# Patient Record
Sex: FEMALE | Race: WHITE | NOT HISPANIC OR LATINO
[De-identification: names, ages, dates, MRNs, and addresses within clinical notes are randomized per-mention and may not be internally consistent; named-entity substitution may affect disease eponyms.]

---

## 2017-03-20 ENCOUNTER — APPOINTMENT (OUTPATIENT)
Age: 37
End: 2017-03-20

## 2017-03-20 VITALS
WEIGHT: 108 LBS | HEIGHT: 63 IN | HEART RATE: 97 BPM | TEMPERATURE: 98.2 F | SYSTOLIC BLOOD PRESSURE: 117 MMHG | DIASTOLIC BLOOD PRESSURE: 83 MMHG | RESPIRATION RATE: 14 BRPM | BODY MASS INDEX: 19.14 KG/M2

## 2017-07-18 LAB
ALBUMIN SERPL ELPH-MCNC: 4 G/DL
ALP BLD-CCNC: 48 U/L
ALT SERPL-CCNC: 23 U/L
ANION GAP SERPL CALC-SCNC: 16 MMOL/L
AST SERPL-CCNC: 27 U/L
BILIRUB SERPL-MCNC: 0.4 MG/DL
BUN SERPL-MCNC: 8 MG/DL
CALCIUM SERPL-MCNC: 9.3 MG/DL
CHLORIDE SERPL-SCNC: 99 MMOL/L
CO2 SERPL-SCNC: 22 MMOL/L
CREAT SERPL-MCNC: 0.8 MG/DL
POTASSIUM SERPL-SCNC: 3.9 MMOL/L
PROT SERPL-MCNC: 6.7 G/DL
SODIUM SERPL-SCNC: 137 MMOL/L

## 2017-07-19 ENCOUNTER — APPOINTMENT (OUTPATIENT)
Age: 37
End: 2017-07-19

## 2017-07-19 VITALS
TEMPERATURE: 98.4 F | HEIGHT: 63 IN | SYSTOLIC BLOOD PRESSURE: 127 MMHG | BODY MASS INDEX: 19.49 KG/M2 | HEART RATE: 103 BPM | WEIGHT: 110 LBS | RESPIRATION RATE: 14 BRPM | DIASTOLIC BLOOD PRESSURE: 87 MMHG

## 2017-07-24 LAB
HBV DNA # SERPL NAA+PROBE: 170 IU/ML
HEPB DNA PCR LOG: 2.23 LOGIU/ML

## 2018-02-21 LAB
AFP-TM SERPL-MCNC: 2.4 NG/ML
ALBUMIN SERPL ELPH-MCNC: 4.1 G/DL
ALP BLD-CCNC: 54 U/L
ALT SERPL-CCNC: 23 U/L
ANION GAP SERPL CALC-SCNC: 13 MMOL/L
AST SERPL-CCNC: 21 U/L
BASOPHILS # BLD AUTO: 0.04 K/UL
BASOPHILS NFR BLD AUTO: 0.7 %
BILIRUB SERPL-MCNC: <0.2 MG/DL
BUN SERPL-MCNC: 10 MG/DL
CALCIUM SERPL-MCNC: 9.1 MG/DL
CHLORIDE SERPL-SCNC: 105 MMOL/L
CO2 SERPL-SCNC: 24 MMOL/L
CREAT SERPL-MCNC: 0.73 MG/DL
EOSINOPHIL # BLD AUTO: 0.14 K/UL
EOSINOPHIL NFR BLD AUTO: 2.3 %
HBV SURFACE AB SER QL: NONREACTIVE
HBV SURFACE AG SER QL: REACTIVE
HCT VFR BLD CALC: 38.4 %
HGB BLD-MCNC: 12.7 G/DL
IMM GRANULOCYTES NFR BLD AUTO: 0.2 %
LYMPHOCYTES # BLD AUTO: 2.6 K/UL
LYMPHOCYTES NFR BLD AUTO: 43.3 %
MAN DIFF?: NORMAL
MCHC RBC-ENTMCNC: 32.2 PG
MCHC RBC-ENTMCNC: 33.1 GM/DL
MCV RBC AUTO: 97.2 FL
MONOCYTES # BLD AUTO: 0.44 K/UL
MONOCYTES NFR BLD AUTO: 7.3 %
NEUTROPHILS # BLD AUTO: 2.77 K/UL
NEUTROPHILS NFR BLD AUTO: 46.2 %
PLATELET # BLD AUTO: 237 K/UL
POTASSIUM SERPL-SCNC: 3.8 MMOL/L
PROT SERPL-MCNC: 6.7 G/DL
RBC # BLD: 3.95 M/UL
RBC # FLD: 12.8 %
SODIUM SERPL-SCNC: 142 MMOL/L
WBC # FLD AUTO: 6 K/UL

## 2018-02-22 LAB
HBV DNA # SERPL NAA+PROBE: 132 IU/ML
HEPB DNA PCR LOG: 2.12 LOGIU/ML

## 2018-02-26 ENCOUNTER — APPOINTMENT (OUTPATIENT)
Age: 38
End: 2018-02-26
Payer: COMMERCIAL

## 2018-02-26 VITALS
TEMPERATURE: 98.1 F | HEIGHT: 63 IN | WEIGHT: 113 LBS | RESPIRATION RATE: 17 BRPM | HEART RATE: 103 BPM | DIASTOLIC BLOOD PRESSURE: 88 MMHG | BODY MASS INDEX: 20.02 KG/M2 | SYSTOLIC BLOOD PRESSURE: 131 MMHG

## 2018-02-26 PROCEDURE — 99214 OFFICE O/P EST MOD 30 MIN: CPT

## 2018-03-26 ENCOUNTER — FORM ENCOUNTER (OUTPATIENT)
Age: 38
End: 2018-03-26

## 2018-03-27 ENCOUNTER — OUTPATIENT (OUTPATIENT)
Dept: OUTPATIENT SERVICES | Facility: HOSPITAL | Age: 38
LOS: 1 days | End: 2018-03-27
Payer: COMMERCIAL

## 2018-03-27 ENCOUNTER — APPOINTMENT (OUTPATIENT)
Dept: ULTRASOUND IMAGING | Facility: CLINIC | Age: 38
End: 2018-03-27
Payer: COMMERCIAL

## 2018-03-27 DIAGNOSIS — B19.10 UNSPECIFIED VIRAL HEPATITIS B WITHOUT HEPATIC COMA: ICD-10-CM

## 2018-03-27 DIAGNOSIS — Z00.8 ENCOUNTER FOR OTHER GENERAL EXAMINATION: ICD-10-CM

## 2018-03-27 PROCEDURE — 76700 US EXAM ABDOM COMPLETE: CPT

## 2018-03-27 PROCEDURE — 76700 US EXAM ABDOM COMPLETE: CPT | Mod: 26

## 2018-04-16 ENCOUNTER — APPOINTMENT (OUTPATIENT)
Age: 38
End: 2018-04-16
Payer: COMMERCIAL

## 2018-04-16 PROCEDURE — 91200 LIVER ELASTOGRAPHY: CPT

## 2018-08-27 ENCOUNTER — APPOINTMENT (OUTPATIENT)
Dept: HEPATOLOGY | Facility: CLINIC | Age: 38
End: 2018-08-27

## 2018-08-27 ENCOUNTER — APPOINTMENT (OUTPATIENT)
Age: 38
End: 2018-08-27

## 2018-12-03 ENCOUNTER — APPOINTMENT (OUTPATIENT)
Dept: HEPATOLOGY | Facility: CLINIC | Age: 38
End: 2018-12-03
Payer: COMMERCIAL

## 2018-12-03 VITALS
WEIGHT: 115 LBS | HEART RATE: 114 BPM | SYSTOLIC BLOOD PRESSURE: 125 MMHG | HEIGHT: 63 IN | RESPIRATION RATE: 17 BRPM | DIASTOLIC BLOOD PRESSURE: 76 MMHG | TEMPERATURE: 99 F | BODY MASS INDEX: 20.38 KG/M2

## 2018-12-03 PROCEDURE — 99213 OFFICE O/P EST LOW 20 MIN: CPT

## 2018-12-04 LAB
HEPATITIS A IGG ANTIBODY: NONREACTIVE
IGA SER QL IEP: 149 MG/DL
IGG SER QL IEP: 1012 MG/DL
IGM SER QL IEP: 45 MG/DL

## 2018-12-06 LAB
ANA SER IF-ACNC: NEGATIVE
BILE AC SER-MCNC: 10.8 UMOL/L
MITOCHONDRIA AB SER IF-ACNC: NORMAL
SMOOTH MUSCLE AB SER QL IF: NORMAL

## 2019-02-07 ENCOUNTER — APPOINTMENT (OUTPATIENT)
Dept: RHEUMATOLOGY | Facility: CLINIC | Age: 39
End: 2019-02-07
Payer: COMMERCIAL

## 2019-02-07 VITALS
OXYGEN SATURATION: 98 % | BODY MASS INDEX: 20.55 KG/M2 | HEART RATE: 111 BPM | HEIGHT: 63 IN | DIASTOLIC BLOOD PRESSURE: 84 MMHG | WEIGHT: 116 LBS | SYSTOLIC BLOOD PRESSURE: 134 MMHG | TEMPERATURE: 98.7 F | RESPIRATION RATE: 16 BRPM

## 2019-02-07 DIAGNOSIS — Z00.00 ENCOUNTER FOR GENERAL ADULT MEDICAL EXAMINATION W/OUT ABNORMAL FINDINGS: ICD-10-CM

## 2019-02-07 DIAGNOSIS — Z71.6 TOBACCO ABUSE COUNSELING: ICD-10-CM

## 2019-02-07 DIAGNOSIS — L29.9 PRURITUS, UNSPECIFIED: ICD-10-CM

## 2019-02-07 PROCEDURE — 99245 OFF/OP CONSLTJ NEW/EST HI 55: CPT

## 2019-02-08 LAB
ANA SER IF-ACNC: NEGATIVE
C3 SERPL-MCNC: 131 MG/DL
C4 SERPL-MCNC: 35 MG/DL
CENTROMERE IGG SER-ACNC: <0.2 AL
CONFIRM: 25.7 SEC
DRVVT IMM 1:2 NP PPP: NORMAL
DRVVT SCREEN TO CONFIRM RATIO: 0.88 RATIO
DSDNA AB SER-ACNC: <12 IU/ML
ENA RNP AB SER IA-ACNC: <0.2 AL
ENA SCL70 IGG SER IA-ACNC: <0.2 AL
ENA SM AB SER IA-ACNC: <0.2 AL
ENA SS-A AB SER IA-ACNC: <0.2 AL
ENA SS-B AB SER IA-ACNC: <0.2 AL
SCREEN DRVVT: 27.9 SEC
SILICA CLOTTING TIME INTERPRETATION: NORMAL
SILICA CLOTTING TIME S/C: 0.9 RATIO
THYROGLOB AB SERPL-ACNC: <20 IU/ML
THYROPEROXIDASE AB SERPL IA-ACNC: <10 IU/ML
TSH SERPL-ACNC: 2.02 UIU/ML

## 2019-02-09 LAB
B2 GLYCOPROT1 IGA SERPL IA-ACNC: <5 SAU
B2 GLYCOPROT1 IGG SER-ACNC: <5 SGU
B2 GLYCOPROT1 IGM SER-ACNC: <5 SMU
CARDIOLIPIN IGM SER-MCNC: <5 GPL
CARDIOLIPIN IGM SER-MCNC: <5 MPL
DEPRECATED CARDIOLIPIN IGA SER: <5 APL
G6PD SER-CCNC: 15.5 U/G HGB

## 2019-02-10 LAB
PS IGA SER QL: <20 U/ML
PS IGG SER QL: 10 U/ML
PS IGM SER QL: <25 U/ML

## 2019-02-11 PROBLEM — Z71.6 ENCOUNTER FOR SMOKING CESSATION COUNSELING: Status: ACTIVE | Noted: 2019-02-11

## 2019-02-15 LAB — RNA POLYMERASE III IGG: <10 U

## 2019-02-26 ENCOUNTER — OTHER (OUTPATIENT)
Age: 39
End: 2019-02-26

## 2019-02-28 ENCOUNTER — APPOINTMENT (OUTPATIENT)
Dept: RHEUMATOLOGY | Facility: CLINIC | Age: 39
End: 2019-02-28
Payer: COMMERCIAL

## 2019-02-28 VITALS
DIASTOLIC BLOOD PRESSURE: 89 MMHG | HEART RATE: 109 BPM | HEIGHT: 63 IN | BODY MASS INDEX: 20.91 KG/M2 | WEIGHT: 118 LBS | SYSTOLIC BLOOD PRESSURE: 129 MMHG | TEMPERATURE: 99.1 F | OXYGEN SATURATION: 98 %

## 2019-02-28 DIAGNOSIS — I73.00 RAYNAUD'S SYNDROME W/OUT GANGRENE: ICD-10-CM

## 2019-02-28 PROCEDURE — 99214 OFFICE O/P EST MOD 30 MIN: CPT

## 2019-05-03 ENCOUNTER — APPOINTMENT (OUTPATIENT)
Dept: CARDIOLOGY | Facility: CLINIC | Age: 39
End: 2019-05-03
Payer: COMMERCIAL

## 2019-05-03 VITALS
SYSTOLIC BLOOD PRESSURE: 135 MMHG | HEIGHT: 63 IN | WEIGHT: 120 LBS | BODY MASS INDEX: 21.26 KG/M2 | DIASTOLIC BLOOD PRESSURE: 95 MMHG | HEART RATE: 88 BPM | OXYGEN SATURATION: 100 %

## 2019-05-03 PROCEDURE — 99204 OFFICE O/P NEW MOD 45 MIN: CPT

## 2019-05-03 RX ORDER — MULTIVITAMIN
TABLET ORAL
Refills: 0 | Status: ACTIVE | COMMUNITY

## 2019-05-05 NOTE — ASSESSMENT
[FreeTextEntry1] : Primary Raynaud's \par Livedo \par Active smoking \par No clear symptoms of claudication \par ADHD with active stimulant use\par \par Plan \par Check ABIS PVRS. \par Check Arterial duplex. \par Decrease Adderall if possible \par Must quick smoking. \par Agree with nifedipine\par immediately notify if signs of ulceration. \par

## 2019-05-05 NOTE — REASON FOR VISIT
[Initial Evaluation] : an initial evaluation of [FreeTextEntry2] : wenceslao  [FreeTextEntry1] : 39 y/o current smoker, on Adderall, hepatitis B in 2004. h/o Raynaud's presents for initial evaluation. \par Raynaud's longstanding, affects all extremities, responds to rewarming. Clearly worse in winter. No history of ulceration in any of her digits. 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 This is also longstanding. Seen by rheumatology who performed serologies that are negative. \par \par Denies sytemic symptoms, weight loss, other arthralgias, hematuria, signs or symptoms of vasculitis, wounds/ulcers, etc. \par \par Was prescribed nifedipine which she has not yet started.

## 2019-05-05 NOTE — PHYSICAL EXAM
[General Appearance - Well Developed] : well developed [Normal Conjunctiva] : the conjunctiva exhibited no abnormalities [Normal Oral Mucosa] : normal oral mucosa [Heart Sounds] : normal S1 and S2 [Bowel Sounds] : normal bowel sounds [Abnormal Walk] : normal gait [Nail Clubbing] : no clubbing of the fingernails [Oriented To Time, Place, And Person] : oriented to person, place, and time [2+] : left 2+ [1+] : left 1+ [Nail Splinter Hemorrhages] : no splinter hemorrhages of the nails [Petechial Hemorrhages (___cm)] : no petechial hemorrhages [] : no ischemic changes [Skin Turgor] : normal skin turgor [Skin Lesions] : no skin lesions [FreeTextEntry1] : lidedo diffusely over extremities and torso.

## 2019-06-26 ENCOUNTER — FORM ENCOUNTER (OUTPATIENT)
Age: 39
End: 2019-06-26

## 2019-06-27 ENCOUNTER — APPOINTMENT (OUTPATIENT)
Dept: ULTRASOUND IMAGING | Facility: HOSPITAL | Age: 39
End: 2019-06-27
Payer: COMMERCIAL

## 2019-06-27 ENCOUNTER — OUTPATIENT (OUTPATIENT)
Dept: OUTPATIENT SERVICES | Facility: HOSPITAL | Age: 39
LOS: 1 days | End: 2019-06-27
Payer: COMMERCIAL

## 2019-06-27 DIAGNOSIS — I73.00 RAYNAUD'S SYNDROME WITHOUT GANGRENE: ICD-10-CM

## 2019-06-27 PROCEDURE — 93925 LOWER EXTREMITY STUDY: CPT | Mod: 26

## 2019-06-27 PROCEDURE — 93923 UPR/LXTR ART STDY 3+ LVLS: CPT | Mod: 26

## 2019-06-27 PROCEDURE — 93925 LOWER EXTREMITY STUDY: CPT

## 2019-06-27 PROCEDURE — 93923 UPR/LXTR ART STDY 3+ LVLS: CPT

## 2019-08-15 ENCOUNTER — APPOINTMENT (OUTPATIENT)
Dept: HEPATOLOGY | Facility: CLINIC | Age: 39
End: 2019-08-15

## 2019-09-27 ENCOUNTER — APPOINTMENT (OUTPATIENT)
Dept: HEPATOLOGY | Facility: CLINIC | Age: 39
End: 2019-09-27
Payer: COMMERCIAL

## 2019-09-27 VITALS
HEART RATE: 88 BPM | HEIGHT: 63 IN | TEMPERATURE: 98.2 F | RESPIRATION RATE: 16 BRPM | WEIGHT: 128 LBS | SYSTOLIC BLOOD PRESSURE: 119 MMHG | BODY MASS INDEX: 22.68 KG/M2 | DIASTOLIC BLOOD PRESSURE: 81 MMHG

## 2019-09-27 PROCEDURE — 99214 OFFICE O/P EST MOD 30 MIN: CPT

## 2019-09-30 LAB
AFP-TM SERPL-MCNC: 68.3 NG/ML
ALBUMIN SERPL ELPH-MCNC: 3.9 G/DL
ALP BLD-CCNC: 48 U/L
ALT SERPL-CCNC: 12 U/L
ANION GAP SERPL CALC-SCNC: 15 MMOL/L
AST SERPL-CCNC: 15 U/L
BASOPHILS # BLD AUTO: 0.06 K/UL
BASOPHILS NFR BLD AUTO: 0.9 %
BILIRUB SERPL-MCNC: 0.2 MG/DL
BUN SERPL-MCNC: 9 MG/DL
CALCIUM SERPL-MCNC: 9.6 MG/DL
CHLORIDE SERPL-SCNC: 101 MMOL/L
CO2 SERPL-SCNC: 20 MMOL/L
CREAT SERPL-MCNC: 0.73 MG/DL
EOSINOPHIL # BLD AUTO: 0.1 K/UL
EOSINOPHIL NFR BLD AUTO: 1.4 %
HBV CORE IGG+IGM SER QL: REACTIVE
HBV DNA # SERPL NAA+PROBE: 4385 IU/ML
HBV SURFACE AB SER QL: NONREACTIVE
HBV SURFACE AG SER QL: REACTIVE
HCT VFR BLD CALC: 40.6 %
HEPB DNA PCR LOG: 3.64
HGB BLD-MCNC: 13.1 G/DL
IMM GRANULOCYTES NFR BLD AUTO: 0.1 %
LYMPHOCYTES # BLD AUTO: 1.92 K/UL
LYMPHOCYTES NFR BLD AUTO: 27.7 %
MAN DIFF?: NORMAL
MCHC RBC-ENTMCNC: 30.5 PG
MCHC RBC-ENTMCNC: 32.3 GM/DL
MCV RBC AUTO: 94.4 FL
MONOCYTES # BLD AUTO: 0.27 K/UL
MONOCYTES NFR BLD AUTO: 3.9 %
NEUTROPHILS # BLD AUTO: 4.56 K/UL
NEUTROPHILS NFR BLD AUTO: 66 %
PLATELET # BLD AUTO: 270 K/UL
POTASSIUM SERPL-SCNC: 4.2 MMOL/L
PROT SERPL-MCNC: 6.5 G/DL
RBC # BLD: 4.3 M/UL
RBC # FLD: 12 %
SODIUM SERPL-SCNC: 136 MMOL/L
WBC # FLD AUTO: 6.92 K/UL

## 2019-10-01 LAB
HBV E AB SER QL: POSITIVE
HBV E AG SER QL: NEGATIVE

## 2019-10-07 ENCOUNTER — FORM ENCOUNTER (OUTPATIENT)
Age: 39
End: 2019-10-07

## 2019-10-08 ENCOUNTER — APPOINTMENT (OUTPATIENT)
Dept: ULTRASOUND IMAGING | Facility: CLINIC | Age: 39
End: 2019-10-08
Payer: MEDICAID

## 2019-10-08 ENCOUNTER — OUTPATIENT (OUTPATIENT)
Dept: OUTPATIENT SERVICES | Facility: HOSPITAL | Age: 39
LOS: 1 days | End: 2019-10-08
Payer: MEDICAID

## 2019-10-08 DIAGNOSIS — Z00.8 ENCOUNTER FOR OTHER GENERAL EXAMINATION: ICD-10-CM

## 2019-10-08 PROCEDURE — 76700 US EXAM ABDOM COMPLETE: CPT

## 2019-10-08 PROCEDURE — 76700 US EXAM ABDOM COMPLETE: CPT | Mod: 26

## 2019-11-27 ENCOUNTER — APPOINTMENT (OUTPATIENT)
Dept: HEPATOLOGY | Facility: CLINIC | Age: 39
End: 2019-11-27
Payer: SELF-PAY

## 2019-11-27 VITALS
SYSTOLIC BLOOD PRESSURE: 117 MMHG | HEIGHT: 63 IN | RESPIRATION RATE: 16 BRPM | HEART RATE: 85 BPM | TEMPERATURE: 98.3 F | BODY MASS INDEX: 24.1 KG/M2 | DIASTOLIC BLOOD PRESSURE: 77 MMHG | WEIGHT: 136 LBS

## 2019-11-27 PROCEDURE — 99214 OFFICE O/P EST MOD 30 MIN: CPT

## 2019-11-27 RX ORDER — DEXTROAMPHETAMINE SACCHARATE, AMPHETAMINE ASPARTATE, DEXTROAMPHETAMINE SULFATE, AND AMPHETAMINE SULFATE 3.75; 3.75; 3.75; 3.75 MG/1; MG/1; MG/1; MG/1
TABLET ORAL
Refills: 0 | Status: DISCONTINUED | COMMUNITY
End: 2019-11-27

## 2019-11-27 RX ORDER — FEXOFENADINE HCL 60 MG
CAPSULE ORAL
Refills: 0 | Status: DISCONTINUED | COMMUNITY
End: 2019-11-27

## 2019-11-27 RX ORDER — NIFEDIPINE 30 MG/1
30 TABLET, EXTENDED RELEASE ORAL
Qty: 30 | Refills: 1 | Status: DISCONTINUED | COMMUNITY
Start: 2019-02-28 | End: 2019-11-27

## 2020-07-23 ENCOUNTER — APPOINTMENT (OUTPATIENT)
Dept: HEPATOLOGY | Facility: CLINIC | Age: 40
End: 2020-07-23

## 2022-06-20 ENCOUNTER — APPOINTMENT (OUTPATIENT)
Dept: HEPATOLOGY | Facility: CLINIC | Age: 42
End: 2022-06-20
Payer: MEDICAID

## 2022-06-20 VITALS
SYSTOLIC BLOOD PRESSURE: 130 MMHG | WEIGHT: 134 LBS | DIASTOLIC BLOOD PRESSURE: 88 MMHG | HEIGHT: 63 IN | HEART RATE: 97 BPM | BODY MASS INDEX: 23.74 KG/M2 | RESPIRATION RATE: 15 BRPM

## 2022-06-20 PROCEDURE — 99214 OFFICE O/P EST MOD 30 MIN: CPT

## 2022-06-20 RX ORDER — DEXTROAMPHETAMINE SACCHARATE, AMPHETAMINE ASPARTATE, DEXTROAMPHETAMINE SULFATE, AND AMPHETAMINE SULFATE 3.75; 3.75; 3.75; 3.75 MG/1; MG/1; MG/1; MG/1
TABLET ORAL
Refills: 0 | Status: ACTIVE | COMMUNITY

## 2022-06-20 RX ORDER — FLUOXETINE HCL 10 MG
TABLET ORAL
Refills: 0 | Status: DISCONTINUED | COMMUNITY
End: 2022-06-20

## 2022-06-20 NOTE — HISTORY OF PRESENT ILLNESS
[de-identified] : Ibis Vargas 41yoF here to re-established care for chronic hepatitis B. She was seen by Dr. Lazaro Cervantes on 11/27/2019 and was lost to follow up. She currently not on treatment for hepatitis B. She feels well. No recent imaging. \par \par She reports being diagnosed with hepatitis B in 2004. At that time, she was hepatitis B E antigen positive,  the ALT was 114 with an HBV DNA of 119 million. A liver biopsy performed January 14, 2005 showed grade 1, stage 0 disease. She initially was started on Pegasys which she took for several months and caused significant nausea and vomiting. This was stopped and she was treated with Hepsera for several years and was placed on Viread. While on therapy, she lost E antigen and developed E. antibody. Virus had become undetectable and her ALT normalized. She stopped taking medication on her own AGAINST MEDICAL ADVICE in 2016.\par \par  Hepatitis C antibody is negative and delta antigen is negative\par \par BW from 6/2/22 ALT 51, AST 51, HBVDNA 400 IU/mL, AFP 2.9, HBeAg neg, HBeAb pos. \par \par Blood tests September 27, 2019 platelet count 270,000, alpha-fetoprotein 68.3, hepatitis B E antigen negative, hepatitis B E. antibody positive, ALT 12, AST 15, creatinine 0.73, total bilirubin 0.2, HBV DNA 4385. Abdominal sonogram  October 8, 2019 with no lesions in the liver\par \par Blood tests July 31, 2019 ALT 16, AST 12\par \par Blood tests from 11/28/18 ALT 20, AST 21, Tbil 0.4, HBsAg positive, HBsAb neg, HBeAg neg, HBeAb positive, HBVDNA 1,470 IU/mL. \par \par Blood tests February 20, 2018 ALT 23, AST 21, creatinine 0.73, HBV  international units\par \par Blood tests July 17, 2017 ALT 23, AST 27, HBV \par A fibroscan performed on March 20 ,2017 showed F0, S1 disease.\par \par Abdominal sonogram December 16, 2016 shows no lesions in the liver.\par \par Blood tests December 16, 2016 CBC with platelets unremarkable, creatinine 0.76, ALT 26, alpha-fetoprotein 2.5, hepatitis B surface antigen positive, hepatitis B E antigen negative, hepatitis B surface antibody negative. HBV DNA is 100 copies/mL\par \par Blood tests May 5, 2015 normal CBC, creatinine 0.85, ALT 18\par a sonogram from May 30, 2014 showed no lesions in the liver\par \par Her blood tests from August 2013 revealed normal CBC, creatinine 0.5, ALT 16, HBV DNA is less than 100 copies.  An abdominal sonogram from 8/17/2013 showed no lesions in the liver

## 2022-06-20 NOTE — PHYSICAL EXAM
[Liver Size (___ Cm)] : Liver size [unfilled] cm [Non-Tender] : non-tender [General Appearance - Alert] : alert [General Appearance - In No Acute Distress] : in no acute distress [Sclera] : the sclera and conjunctiva were normal [Neck Appearance] : the appearance of the neck was normal [Neck Cervical Mass (___cm)] : no neck mass was observed [Jugular Venous Distention Increased] : there was no jugular-venous distention [Thyroid Diffuse Enlargement] : the thyroid was not enlarged [Thyroid Nodule] : there were no palpable thyroid nodules [Auscultation Breath Sounds / Voice Sounds] : lungs were clear to auscultation bilaterally [Heart Rate And Rhythm] : heart rate was normal and rhythm regular [Heart Sounds] : normal S1 and S2 [Heart Sounds Gallop] : no gallops [Murmurs] : no murmurs [Heart Sounds Pericardial Friction Rub] : no pericardial rub [Edema] : there was no peripheral edema [Bowel Sounds] : normal bowel sounds [Abdomen Soft] : soft [Abdomen Tenderness] : non-tender [] : no hepato-splenomegaly [Abdomen Mass (___ Cm)] : no abdominal mass palpated [Oriented To Time, Place, And Person] : oriented to person, place, and time [Affect] : the affect was normal [Scleral Icterus] : No Scleral Icterus [Spider Angioma] : No spider angioma(s) were observed [Abdominal Bruit] : no abdominal bruit [Abdominal  Ascites] : no ascites [Asterixis] : no asterixis observed [Jaundice] : No jaundice [Palmar Erythema] : no Palmar Erythema

## 2022-06-20 NOTE — ASSESSMENT
[FreeTextEntry1] : Ibis Vargas 41yoF here to re-established care for chronic hepatitis B with HBeAg neg, HBeAb pos. She was seen by Dr. Lazaro Cervantes on 11/27/2019 and was lost to follow up. \par \par She currently not on treatment for hepatitis B. She was treated in the past and stopped Viread on her own AGAINST MEDICAL ADVICE in 2016.  Recent BW now showed elevated ALT 51 with low HBVDNA level.  F0 on Fibroscan April 2018. I recommended repeating fibroscan test and if has moderate fibrosis, will discuss re-starting treatment. No recent imaging, abdo US ordered. \par \par I discussed at length with the patient regarding hepatitis B. We spoke about the natural history, modes of transmission, diagnostic evaluation and treatment. I have explained the risk of development of liver cancer and have recommended imaging every 6 months to screen for primary hepatocellular carcinoma. I have reviewed parameters for stopping treatment. I have explained that if there is a hepatitis B surface antigen seroconversion with the development of hepatitis B surface antibody, therapy may be discontinued. I have reviewed side effects of therapy.\par \par She will call me to discuss fibroscan test and abdo US. If there are no significant abnormalities, I would like to see her back in 6 months with repeat laboratory tests and sonogram. \par  The patient is a 28y Female complaining of foot pain/injury.

## 2022-08-26 ENCOUNTER — APPOINTMENT (OUTPATIENT)
Dept: HEPATOLOGY | Facility: CLINIC | Age: 42
End: 2022-08-26

## 2022-08-26 PROCEDURE — 91200 LIVER ELASTOGRAPHY: CPT

## 2022-09-14 ENCOUNTER — OUTPATIENT (OUTPATIENT)
Dept: OUTPATIENT SERVICES | Facility: HOSPITAL | Age: 42
LOS: 1 days | End: 2022-09-14
Payer: MEDICAID

## 2022-09-14 ENCOUNTER — APPOINTMENT (OUTPATIENT)
Dept: ULTRASOUND IMAGING | Facility: CLINIC | Age: 42
End: 2022-09-14

## 2022-09-14 DIAGNOSIS — B19.10 UNSPECIFIED VIRAL HEPATITIS B WITHOUT HEPATIC COMA: ICD-10-CM

## 2022-09-14 PROCEDURE — 76700 US EXAM ABDOM COMPLETE: CPT

## 2022-09-14 PROCEDURE — 76700 US EXAM ABDOM COMPLETE: CPT | Mod: 26

## 2022-09-16 ENCOUNTER — NON-APPOINTMENT (OUTPATIENT)
Age: 42
End: 2022-09-16

## 2022-09-26 ENCOUNTER — NON-APPOINTMENT (OUTPATIENT)
Age: 42
End: 2022-09-26

## 2022-12-19 ENCOUNTER — APPOINTMENT (OUTPATIENT)
Dept: HEPATOLOGY | Facility: CLINIC | Age: 42
End: 2022-12-19

## 2022-12-19 VITALS
SYSTOLIC BLOOD PRESSURE: 109 MMHG | DIASTOLIC BLOOD PRESSURE: 77 MMHG | HEART RATE: 103 BPM | HEIGHT: 63 IN | WEIGHT: 139 LBS | BODY MASS INDEX: 24.63 KG/M2

## 2022-12-19 DIAGNOSIS — B19.10 UNSPECIFIED VIRAL HEPATITIS B W/OUT HEPATIC COMA: ICD-10-CM

## 2022-12-19 PROCEDURE — 99214 OFFICE O/P EST MOD 30 MIN: CPT

## 2022-12-19 NOTE — REVIEW OF SYSTEMS
[As Noted in HPI] : as noted in HPI [Itching] : itching [Negative] : Heme/Lymph [de-identified] : worsen at night

## 2022-12-19 NOTE — PHYSICAL EXAM
[Scleral Icterus] : No Scleral Icterus [Spider Angioma] : No spider angioma(s) were observed [Abdominal Bruit] : no abdominal bruit [Abdominal  Ascites] : no ascites [Liver Size (___ Cm)] : Liver size [unfilled] cm [Non-Tender] : non-tender [Asterixis] : no asterixis observed [Jaundice] : No jaundice [Palmar Erythema] : no Palmar Erythema [General Appearance - Alert] : alert [General Appearance - In No Acute Distress] : in no acute distress [Sclera] : the sclera and conjunctiva were normal [Neck Appearance] : the appearance of the neck was normal [Neck Cervical Mass (___cm)] : no neck mass was observed [Jugular Venous Distention Increased] : there was no jugular-venous distention [Thyroid Diffuse Enlargement] : the thyroid was not enlarged [Thyroid Nodule] : there were no palpable thyroid nodules [Auscultation Breath Sounds / Voice Sounds] : lungs were clear to auscultation bilaterally [Heart Rate And Rhythm] : heart rate was normal and rhythm regular [Heart Sounds] : normal S1 and S2 [Heart Sounds Gallop] : no gallops [Murmurs] : no murmurs [Heart Sounds Pericardial Friction Rub] : no pericardial rub [Edema] : there was no peripheral edema [Bowel Sounds] : normal bowel sounds [Abdomen Soft] : soft [Abdomen Tenderness] : non-tender [] : no hepato-splenomegaly [Abdomen Mass (___ Cm)] : no abdominal mass palpated [Oriented To Time, Place, And Person] : oriented to person, place, and time [Affect] : the affect was normal

## 2022-12-19 NOTE — ASSESSMENT
[FreeTextEntry1] : Ibis Vargas 42 yoF here for follow up for chronic hepatitis B  and fatty liver. \par \par # Chronic hepatitis B\par -She currently not on treatment for hepatitis B. She was treated in the past and stopped Viread on her own AGAINST MEDICAL ADVICE in 2016.  While on therapy, she lost E antigen and developed E. antibody. Virus had become undetectable and her ALT normalized. Last BW showed elevated ALT 51 with low HBVDNA level.  F0 on Fibroscan 8/26/22. She will repeat BW now and is due for abdo US in March 2023. \par \par I discussed at length with the patient regarding hepatitis B. We spoke about the natural history, modes of transmission, diagnostic evaluation and treatment. I have explained the risk of development of liver cancer and have recommended imaging every 6 months to screen for primary hepatocellular carcinoma. I have reviewed parameters for stopping treatment. I have explained that if there is a hepatitis B surface antigen seroconversion with the development of hepatitis B surface antibody, therapy may be discontinued. I have reviewed side effects of therapy.\par \par -Currently pregnant at 13 weeks- I have recommended repeating her blood tests this week and again in March 2023. Should her HBV DNA be more than 100,000, I would recommended starting antiviral treatment with tenofovir 300 mg po daily at 28 weeks gestational age (around 3/29-4/4/23) and until she delivers. I have discussed with her the risks of passing hepatitis B to her child at the time of birth. The new child will need within the first 12 hours of life HBIG and hepatitis B vaccination to prevent vertical transmission. \par \par #Fatty liver \par I spoke with the patient at length regarding fatty liver disease.  I have explained that the best therapy is diet and exercise. I have reviewed and appropriate diet with the patient.\par \par Plan:\par BW now and she will call me to discuss results. Repeat abdo US and BW again in March 2023. RTC in about 6-7 months. \par

## 2022-12-19 NOTE — HISTORY OF PRESENT ILLNESS
[de-identified] : Ibis Vargas 42yoF here for follow up for chronic hepatitis B and fatty liver. \par \par 12/19/22 Feels well without complaints. She is currently 13 weeks preg with second child and is due 6/27/23. No recent BW. Last BW was 5/31/22 ALT 51, AST 51, HBVDNA 400, AFP 2.9. Abdo US 9/14/22 showed fatty liver, no hepatic lesions. \par \par 6/20/22 Initial visit: here to re-established care for chronic hepatitis B. She was seen by Dr. Lazaro Cervantes on 11/27/2019 and was lost to follow up. She currently not on treatment for hepatitis B. She feels well. No recent imaging. \par \par She reports being diagnosed with hepatitis B in 2004. At that time, she was hepatitis B E antigen positive,  the ALT was 114 with an HBV DNA of 119 million. A liver biopsy performed January 14, 2005 showed grade 1, stage 0 disease. She initially was started on Pegasys which she took for several months and caused significant nausea and vomiting. This was stopped and she was treated with Hepsera for several years and was placed on Viread. While on therapy, she lost E antigen and developed E. antibody. Virus had become undetectable and her ALT normalized. She stopped taking medication on her own AGAINST MEDICAL ADVICE in 2016.\par \par  Hepatitis C antibody is negative and delta antigen is negative\par \par BW from 6/2/22 ALT 51, AST 51, HBVDNA 400 IU/mL, AFP 2.9, HBeAg neg, HBeAb pos. \par \par Blood tests September 27, 2019 platelet count 270,000, alpha-fetoprotein 68.3, hepatitis B E antigen negative, hepatitis B E. antibody positive, ALT 12, AST 15, creatinine 0.73, total bilirubin 0.2, HBV DNA 4385. Abdominal sonogram  October 8, 2019 with no lesions in the liver\par \par Blood tests July 31, 2019 ALT 16, AST 12\par \par Blood tests from 11/28/18 ALT 20, AST 21, Tbil 0.4, HBsAg positive, HBsAb neg, HBeAg neg, HBeAb positive, HBVDNA 1,470 IU/mL. \par \par Blood tests February 20, 2018 ALT 23, AST 21, creatinine 0.73, HBV  international units\par \par Blood tests July 17, 2017 ALT 23, AST 27, HBV \par A fibroscan performed on March 20 ,2017 showed F0, S1 disease.\par \par Abdominal sonogram December 16, 2016 shows no lesions in the liver.\par \par Blood tests December 16, 2016 CBC with platelets unremarkable, creatinine 0.76, ALT 26, alpha-fetoprotein 2.5, hepatitis B surface antigen positive, hepatitis B E antigen negative, hepatitis B surface antibody negative. HBV DNA is 100 copies/mL\par \par Blood tests May 5, 2015 normal CBC, creatinine 0.85, ALT 18\par a sonogram from May 30, 2014 showed no lesions in the liver\par \par Her blood tests from August 2013 revealed normal CBC, creatinine 0.5, ALT 16, HBV DNA is less than 100 copies.  An abdominal sonogram from 8/17/2013 showed no lesions in the liver